# Patient Record
Sex: MALE | Race: OTHER | HISPANIC OR LATINO | Employment: UNEMPLOYED | URBAN - METROPOLITAN AREA
[De-identification: names, ages, dates, MRNs, and addresses within clinical notes are randomized per-mention and may not be internally consistent; named-entity substitution may affect disease eponyms.]

---

## 2021-05-01 ENCOUNTER — APPOINTMENT (OUTPATIENT)
Dept: RADIOLOGY | Facility: MEDICAL CENTER | Age: 51
End: 2021-05-01
Attending: EMERGENCY MEDICINE

## 2021-05-01 ENCOUNTER — HOSPITAL ENCOUNTER (EMERGENCY)
Facility: MEDICAL CENTER | Age: 51
End: 2021-05-02
Attending: EMERGENCY MEDICINE

## 2021-05-01 DIAGNOSIS — R55 SYNCOPE, UNSPECIFIED SYNCOPE TYPE: ICD-10-CM

## 2021-05-01 DIAGNOSIS — F10.920 ALCOHOLIC INTOXICATION WITHOUT COMPLICATION (HCC): ICD-10-CM

## 2021-05-01 LAB
ALBUMIN SERPL BCP-MCNC: 4.7 G/DL (ref 3.2–4.9)
ALBUMIN/GLOB SERPL: 1.4 G/DL
ALP SERPL-CCNC: 80 U/L (ref 30–99)
ALT SERPL-CCNC: 20 U/L (ref 2–50)
ANION GAP SERPL CALC-SCNC: 11 MMOL/L (ref 7–16)
AST SERPL-CCNC: 35 U/L (ref 12–45)
BASOPHILS # BLD AUTO: 0.6 % (ref 0–1.8)
BASOPHILS # BLD: 0.03 K/UL (ref 0–0.12)
BILIRUB SERPL-MCNC: 0.3 MG/DL (ref 0.1–1.5)
BUN SERPL-MCNC: 6 MG/DL (ref 8–22)
CALCIUM SERPL-MCNC: 9.2 MG/DL (ref 8.5–10.5)
CHLORIDE SERPL-SCNC: 100 MMOL/L (ref 96–112)
CO2 SERPL-SCNC: 25 MMOL/L (ref 20–33)
CREAT SERPL-MCNC: 0.62 MG/DL (ref 0.5–1.4)
EKG IMPRESSION: NORMAL
EOSINOPHIL # BLD AUTO: 0.04 K/UL (ref 0–0.51)
EOSINOPHIL NFR BLD: 0.7 % (ref 0–6.9)
ERYTHROCYTE [DISTWIDTH] IN BLOOD BY AUTOMATED COUNT: 43 FL (ref 35.9–50)
ETHANOL BLD-MCNC: 264.9 MG/DL (ref 0–10)
GLOBULIN SER CALC-MCNC: 3.3 G/DL (ref 1.9–3.5)
GLUCOSE SERPL-MCNC: 89 MG/DL (ref 65–99)
HCT VFR BLD AUTO: 42.5 % (ref 42–52)
HGB BLD-MCNC: 14.4 G/DL (ref 14–18)
IMM GRANULOCYTES # BLD AUTO: 0.01 K/UL (ref 0–0.11)
IMM GRANULOCYTES NFR BLD AUTO: 0.2 % (ref 0–0.9)
LIPASE SERPL-CCNC: 30 U/L (ref 11–82)
LYMPHOCYTES # BLD AUTO: 2.03 K/UL (ref 1–4.8)
LYMPHOCYTES NFR BLD: 37.5 % (ref 22–41)
MCH RBC QN AUTO: 31.6 PG (ref 27–33)
MCHC RBC AUTO-ENTMCNC: 33.9 G/DL (ref 33.7–35.3)
MCV RBC AUTO: 93.2 FL (ref 81.4–97.8)
MONOCYTES # BLD AUTO: 0.46 K/UL (ref 0–0.85)
MONOCYTES NFR BLD AUTO: 8.5 % (ref 0–13.4)
NEUTROPHILS # BLD AUTO: 2.84 K/UL (ref 1.82–7.42)
NEUTROPHILS NFR BLD: 52.5 % (ref 44–72)
NRBC # BLD AUTO: 0 K/UL
NRBC BLD-RTO: 0 /100 WBC
PLATELET # BLD AUTO: 307 K/UL (ref 164–446)
PMV BLD AUTO: 8.6 FL (ref 9–12.9)
POTASSIUM SERPL-SCNC: 3.5 MMOL/L (ref 3.6–5.5)
PROT SERPL-MCNC: 8 G/DL (ref 6–8.2)
RBC # BLD AUTO: 4.56 M/UL (ref 4.7–6.1)
SODIUM SERPL-SCNC: 136 MMOL/L (ref 135–145)
TROPONIN T SERPL-MCNC: <6 NG/L (ref 6–19)
WBC # BLD AUTO: 5.4 K/UL (ref 4.8–10.8)

## 2021-05-01 PROCEDURE — 85025 COMPLETE CBC W/AUTO DIFF WBC: CPT

## 2021-05-01 PROCEDURE — 71045 X-RAY EXAM CHEST 1 VIEW: CPT

## 2021-05-01 PROCEDURE — 84484 ASSAY OF TROPONIN QUANT: CPT

## 2021-05-01 PROCEDURE — 80053 COMPREHEN METABOLIC PANEL: CPT

## 2021-05-01 PROCEDURE — 93005 ELECTROCARDIOGRAM TRACING: CPT | Performed by: EMERGENCY MEDICINE

## 2021-05-01 PROCEDURE — 700105 HCHG RX REV CODE 258: Performed by: EMERGENCY MEDICINE

## 2021-05-01 PROCEDURE — 70450 CT HEAD/BRAIN W/O DYE: CPT

## 2021-05-01 PROCEDURE — 83690 ASSAY OF LIPASE: CPT

## 2021-05-01 PROCEDURE — 82077 ASSAY SPEC XCP UR&BREATH IA: CPT

## 2021-05-01 PROCEDURE — 99285 EMERGENCY DEPT VISIT HI MDM: CPT

## 2021-05-01 RX ORDER — SODIUM CHLORIDE 9 MG/ML
2000 INJECTION, SOLUTION INTRAVENOUS ONCE
Status: COMPLETED | OUTPATIENT
Start: 2021-05-01 | End: 2021-05-02

## 2021-05-01 RX ADMIN — SODIUM CHLORIDE 2000 ML: 9 INJECTION, SOLUTION INTRAVENOUS at 23:21

## 2021-05-02 VITALS
HEIGHT: 65 IN | SYSTOLIC BLOOD PRESSURE: 90 MMHG | TEMPERATURE: 98.4 F | RESPIRATION RATE: 13 BRPM | OXYGEN SATURATION: 100 % | BODY MASS INDEX: 25.83 KG/M2 | WEIGHT: 155 LBS | DIASTOLIC BLOOD PRESSURE: 62 MMHG | HEART RATE: 60 BPM

## 2021-05-02 NOTE — ED NOTES
Discharge paperwork given to pt, pt states he feels much better, all belongings accounted for, all questions answered, pt ambulated with steady gait to the ER exit

## 2021-05-02 NOTE — ED NOTES
Pt laying in bed, eyes closed, equal chest rise noted, no signs of distress noted at this time, will continue to monitor
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
## 2022-07-18 ENCOUNTER — HOSPITAL ENCOUNTER (EMERGENCY)
Facility: MEDICAL CENTER | Age: 52
End: 2022-07-18
Attending: EMERGENCY MEDICINE

## 2022-07-18 VITALS
RESPIRATION RATE: 16 BRPM | HEIGHT: 65 IN | BODY MASS INDEX: 26.66 KG/M2 | HEART RATE: 87 BPM | OXYGEN SATURATION: 98 % | TEMPERATURE: 97.9 F | WEIGHT: 160 LBS | DIASTOLIC BLOOD PRESSURE: 72 MMHG | SYSTOLIC BLOOD PRESSURE: 105 MMHG

## 2022-07-18 DIAGNOSIS — F10.920 ALCOHOLIC INTOXICATION WITHOUT COMPLICATION (HCC): ICD-10-CM

## 2022-07-18 PROCEDURE — 700111 HCHG RX REV CODE 636 W/ 250 OVERRIDE (IP): Performed by: EMERGENCY MEDICINE

## 2022-07-18 PROCEDURE — 99284 EMERGENCY DEPT VISIT MOD MDM: CPT

## 2022-07-18 PROCEDURE — 90715 TDAP VACCINE 7 YRS/> IM: CPT | Performed by: EMERGENCY MEDICINE

## 2022-07-18 PROCEDURE — 90471 IMMUNIZATION ADMIN: CPT

## 2022-07-18 RX ADMIN — CLOSTRIDIUM TETANI TOXOID ANTIGEN (FORMALDEHYDE INACTIVATED), CORYNEBACTERIUM DIPHTHERIAE TOXOID ANTIGEN (FORMALDEHYDE INACTIVATED), BORDETELLA PERTUSSIS TOXOID ANTIGEN (GLUTARALDEHYDE INACTIVATED), BORDETELLA PERTUSSIS FILAMENTOUS HEMAGGLUTININ ANTIGEN (FORMALDEHYDE INACTIVATED), BORDETELLA PERTUSSIS PERTACTIN ANTIGEN, AND BORDETELLA PERTUSSIS FIMBRIAE 2/3 ANTIGEN 0.5 ML: 5; 2; 2.5; 5; 3; 5 INJECTION, SUSPENSION INTRAMUSCULAR at 08:49
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
## 2022-07-18 NOTE — ED NOTES
Pt provided d/c instructions and verbalizes understanding. Pt has no further questions at this time and ambulated to lobby for d/c

## 2022-07-18 NOTE — ED TRIAGE NOTES
"Chief Complaint   Patient presents with   • Alcohol Intoxication     Patient found down but denies having fallen, has a small abrasion on his right forearm, states he drink \"enough alcohol to get stupid\" but can't elaborate beyond that     Patient BIBA for above. Patient is a poor historian. EMS states patient refused to answer questions, in my care patient was willing to answer questions but had difficulty concentrating and required frequent redirection back to the question. Patient is A/O x3 disoriented to time.     BP (!) 95/68   Pulse 69   Temp 36.4 °C (97.6 °F) (Temporal)   Resp 17   Ht 1.651 m (5' 5\")   Wt 72.6 kg (160 lb)   SpO2 95%   BMI 26.63 kg/m²   "

## 2022-12-28 ENCOUNTER — HOSPITAL ENCOUNTER (EMERGENCY)
Facility: MEDICAL CENTER | Age: 52
End: 2022-12-28
Attending: EMERGENCY MEDICINE

## 2022-12-28 VITALS
HEART RATE: 69 BPM | SYSTOLIC BLOOD PRESSURE: 109 MMHG | WEIGHT: 156.53 LBS | OXYGEN SATURATION: 99 % | BODY MASS INDEX: 26.05 KG/M2 | RESPIRATION RATE: 16 BRPM | TEMPERATURE: 99 F | DIASTOLIC BLOOD PRESSURE: 72 MMHG

## 2022-12-28 DIAGNOSIS — H66.001 NON-RECURRENT ACUTE SUPPURATIVE OTITIS MEDIA OF RIGHT EAR WITHOUT SPONTANEOUS RUPTURE OF TYMPANIC MEMBRANE: ICD-10-CM

## 2022-12-28 PROCEDURE — 99282 EMERGENCY DEPT VISIT SF MDM: CPT

## 2022-12-28 RX ORDER — AMOXICILLIN 500 MG/1
500 CAPSULE ORAL 3 TIMES DAILY
Qty: 30 CAPSULE | Refills: 0 | Status: SHIPPED | OUTPATIENT
Start: 2022-12-28

## 2022-12-28 ASSESSMENT — FIBROSIS 4 INDEX: FIB4 SCORE: 1.3

## 2022-12-28 NOTE — ED TRIAGE NOTES
Chief Complaint   Patient presents with    Ear Pain     Sx for 4 days, getting worse.  Denies hx of same

## 2022-12-28 NOTE — ED PROVIDER NOTES
ED Provider Note    CHIEF COMPLAINT  Chief Complaint   Patient presents with    Ear Pain     Sx for 4 days, getting worse.  Denies hx of same       LIMITATION TO HISTORY   Select: : None he spoke fairly good English    HPI  Farrukh Mccarthy is a 52 y.o. male who presents with a report that he has had right-sided ear pain only for the past 4 days without any discharge or bleeding.  Denies any trauma.  Denies any fever, chills, sweats or other complaints    EXTERNAL RECORDS REVIEWED  Select: Outpatient Notes reviewed his previous ER visits pattern they are of    REVIEW OF SYSTEMS  See HPI for further details.  No reported fever.  All other systems are negative.     PAST MEDICAL HISTORY   No previous significant ear infections    SURGICAL HISTORY  patient denies any surgical history    FAMILY HISTORY  No family history on file.    SOCIAL HISTORY  Social History     Tobacco Use    Smoking status: Never    Smokeless tobacco: Never   Vaping Use    Vaping Use: Never used   Substance and Sexual Activity    Alcohol use: Yes     Comment: states he drinks on ocasion     Drug use: No    Sexual activity: Not on file       CURRENT MEDICATIONS  Home Medications    **Home medications have not yet been reviewed for this encounter**         ALLERGIES  Allergies   Allergen Reactions    Nkda [No Known Drug Allergy]        PHYSICAL EXAM  VITAL SIGNS: /85   Pulse 79   Temp 36.2 °C (97.1 °F) (Temporal)   Resp 18   Wt 71 kg (156 lb 8.4 oz)   SpO2 98%   BMI 26.05 kg/m²    Focused exam shows normal left ear and right-sided ear that is retracted, erythematous with loss of light reflex but no perforation consistent with otitis media on the right    DIAGNOSTIC STUDIES / PROCEDURES      COURSE & MEDICAL DECISION MAKING  Pertinent Labs & Imaging studies reviewed. (See chart for details)    Differential Diagnosis Considered  Otitis media,  Perforation of eardrum    Patient understands his outpatient plan of care including amoxicillin  therapy and use of OTC medications for pain control and return if significant change in symptoms in spite of therapy    The patient will not drink alcohol nor drive with prescribed medications. The patient will return for worsening symptoms and is stable at the time of discharge. The patient verbalizes understanding and will comply.    FINAL IMPRESSION  1. Non-recurrent acute suppurative otitis media of right ear without spontaneous rupture of tympanic membrane           Electronically signed by: Je Hayes M.D., 12/28/2022 12:40 PM     heparin -last dose 0000 2/14  PNV

## 2022-12-28 NOTE — DISCHARGE INSTRUCTIONS
Please take amoxicillin as directed and use OTC medications for pain control such as Motrin and Tylenol

## 2022-12-28 NOTE — ED NOTES
Patient to room from lobby with steady gait. Changed into gown, connected to monitor. Agree with triage note. Charted for ERP.

## 2023-02-16 PROCEDURE — 99283 EMERGENCY DEPT VISIT LOW MDM: CPT

## 2023-02-17 ENCOUNTER — APPOINTMENT (OUTPATIENT)
Dept: RADIOLOGY | Facility: MEDICAL CENTER | Age: 53
End: 2023-02-17
Attending: EMERGENCY MEDICINE

## 2023-02-17 ENCOUNTER — HOSPITAL ENCOUNTER (EMERGENCY)
Facility: MEDICAL CENTER | Age: 53
End: 2023-02-17
Attending: EMERGENCY MEDICINE

## 2023-02-17 VITALS
WEIGHT: 157.63 LBS | HEART RATE: 58 BPM | OXYGEN SATURATION: 95 % | RESPIRATION RATE: 18 BRPM | DIASTOLIC BLOOD PRESSURE: 65 MMHG | BODY MASS INDEX: 26.26 KG/M2 | HEIGHT: 65 IN | TEMPERATURE: 98 F | SYSTOLIC BLOOD PRESSURE: 105 MMHG

## 2023-02-17 DIAGNOSIS — T43.615A ADVERSE EFFECT OF CAFFEINE, INITIAL ENCOUNTER: ICD-10-CM

## 2023-02-17 LAB
ALBUMIN SERPL BCP-MCNC: 4.1 G/DL (ref 3.2–4.9)
ALBUMIN/GLOB SERPL: 1.2 G/DL
ALP SERPL-CCNC: 95 U/L (ref 30–99)
ALT SERPL-CCNC: 17 U/L (ref 2–50)
ANION GAP SERPL CALC-SCNC: 12 MMOL/L (ref 7–16)
AST SERPL-CCNC: 21 U/L (ref 12–45)
BASOPHILS # BLD AUTO: 0.4 % (ref 0–1.8)
BASOPHILS # BLD: 0.03 K/UL (ref 0–0.12)
BILIRUB SERPL-MCNC: 0.6 MG/DL (ref 0.1–1.5)
BUN SERPL-MCNC: 10 MG/DL (ref 8–22)
CALCIUM ALBUM COR SERPL-MCNC: 9.2 MG/DL (ref 8.5–10.5)
CALCIUM SERPL-MCNC: 9.3 MG/DL (ref 8.5–10.5)
CHLORIDE SERPL-SCNC: 97 MMOL/L (ref 96–112)
CO2 SERPL-SCNC: 23 MMOL/L (ref 20–33)
CREAT SERPL-MCNC: 0.68 MG/DL (ref 0.5–1.4)
EKG IMPRESSION: NORMAL
EOSINOPHIL # BLD AUTO: 0.11 K/UL (ref 0–0.51)
EOSINOPHIL NFR BLD: 1.5 % (ref 0–6.9)
ERYTHROCYTE [DISTWIDTH] IN BLOOD BY AUTOMATED COUNT: 40 FL (ref 35.9–50)
GFR SERPLBLD CREATININE-BSD FMLA CKD-EPI: 112 ML/MIN/1.73 M 2
GLOBULIN SER CALC-MCNC: 3.3 G/DL (ref 1.9–3.5)
GLUCOSE BLD STRIP.AUTO-MCNC: 94 MG/DL (ref 65–99)
GLUCOSE SERPL-MCNC: 100 MG/DL (ref 65–99)
HCT VFR BLD AUTO: 39.2 % (ref 42–52)
HGB BLD-MCNC: 13.4 G/DL (ref 14–18)
IMM GRANULOCYTES # BLD AUTO: 0.02 K/UL (ref 0–0.11)
IMM GRANULOCYTES NFR BLD AUTO: 0.3 % (ref 0–0.9)
LYMPHOCYTES # BLD AUTO: 2.34 K/UL (ref 1–4.8)
LYMPHOCYTES NFR BLD: 31 % (ref 22–41)
MCH RBC QN AUTO: 29.6 PG (ref 27–33)
MCHC RBC AUTO-ENTMCNC: 34.2 G/DL (ref 33.7–35.3)
MCV RBC AUTO: 86.7 FL (ref 81.4–97.8)
MONOCYTES # BLD AUTO: 0.69 K/UL (ref 0–0.85)
MONOCYTES NFR BLD AUTO: 9.1 % (ref 0–13.4)
NEUTROPHILS # BLD AUTO: 4.36 K/UL (ref 1.82–7.42)
NEUTROPHILS NFR BLD: 57.7 % (ref 44–72)
NRBC # BLD AUTO: 0 K/UL
NRBC BLD-RTO: 0 /100 WBC
PLATELET # BLD AUTO: 328 K/UL (ref 164–446)
PMV BLD AUTO: 8.7 FL (ref 9–12.9)
POTASSIUM SERPL-SCNC: 3.4 MMOL/L (ref 3.6–5.5)
PROT SERPL-MCNC: 7.4 G/DL (ref 6–8.2)
RBC # BLD AUTO: 4.52 M/UL (ref 4.7–6.1)
SODIUM SERPL-SCNC: 132 MMOL/L (ref 135–145)
TROPONIN T SERPL-MCNC: <6 NG/L (ref 6–19)
WBC # BLD AUTO: 7.6 K/UL (ref 4.8–10.8)

## 2023-02-17 PROCEDURE — 80053 COMPREHEN METABOLIC PANEL: CPT

## 2023-02-17 PROCEDURE — 82962 GLUCOSE BLOOD TEST: CPT

## 2023-02-17 PROCEDURE — 36415 COLL VENOUS BLD VENIPUNCTURE: CPT

## 2023-02-17 PROCEDURE — 71045 X-RAY EXAM CHEST 1 VIEW: CPT

## 2023-02-17 PROCEDURE — 84484 ASSAY OF TROPONIN QUANT: CPT

## 2023-02-17 PROCEDURE — 99283 EMERGENCY DEPT VISIT LOW MDM: CPT

## 2023-02-17 PROCEDURE — 93005 ELECTROCARDIOGRAM TRACING: CPT | Performed by: EMERGENCY MEDICINE

## 2023-02-17 PROCEDURE — 85025 COMPLETE CBC W/AUTO DIFF WBC: CPT

## 2023-02-17 ASSESSMENT — FIBROSIS 4 INDEX: FIB4 SCORE: 1.3

## 2023-02-17 NOTE — ED PROVIDER NOTES
ED Provider Note    CHIEF COMPLAINT  Chief Complaint   Patient presents with    Shortness of Breath     Intermittent, states it started after having one energy drink at 1700 and a [employer] coffee at 1800.  States he feels like there is tingling to his extremities and extra energy going throughout his body.  Concerned there is damage to his blood circulation.  Not experiencing SOB at this time, NAD noted.       EXTERNAL RECORDS REVIEWED  Inpatient Notes prior ER notes for ear pain and alcohol intoxication    HPI/ROS  LIMITATION TO HISTORY   Select: : None  OUTSIDE HISTORIAN(S):  None    Farrukh Mccarthy is a 52 y.o. male who presents to the emerge department feeling generally well with primary complaint of shortness of breath after drinking multiple energy drinks.  He explains that he was at the library this evening and he had an energy drink which she was mixing with water but then also had a [employer] mocha drink as well.  After consuming these beverages he then started to feel generally unwell to include thirst and shortness of breath.  Now starting to feel better as the hours of gone by but was concerned about the intake of these caffeinated and sweet beverages feeling that he was intolerant to them.    He does not have a local primary care physician.  Denies any past medical history.  Denies any over-the-counter medications or prescription medications at this point.    PAST MEDICAL HISTORY   Denies    SURGICAL HISTORY  patient denies any surgical history    FAMILY HISTORY  History reviewed. No pertinent family history.    SOCIAL HISTORY  Social History     Tobacco Use    Smoking status: Some Days     Types: Cigarettes    Smokeless tobacco: Never   Vaping Use    Vaping Use: Never used   Substance and Sexual Activity    Alcohol use: Yes     Comment: Last drink 11/2022    Drug use: No    Sexual activity: Not on file       CURRENT MEDICATIONS  Home Medications       Reviewed by Sophy Bethea R.N. (Registered Nurse)  "on 02/17/23 at 0014  Med List Status: Partial     Medication Last Dose Status   amoxicillin (AMOXIL) 500 MG Cap  Active   ondansetron (ZOFRAN ODT) 4 MG TABLET DISPERSIBLE  Active                    ALLERGIES  Allergies   Allergen Reactions    Nkda [No Known Drug Allergy]        PHYSICAL EXAM  VITAL SIGNS: /65   Pulse (!) 58   Temp 36.7 °C (98 °F) (Temporal)   Resp 18   Ht 1.651 m (5' 5\")   Wt 71.5 kg (157 lb 10.1 oz)   SpO2 95%   BMI 26.23 kg/m²        Pulse ox interpretation: I interpret this pulse ox as normal.  Constitutional: Alert in no apparent distress.  HENT: No signs of trauma, Bilateral external ears normal, Nose normal.   Eyes: Pupils are equal and reactive  Neck: Normal range of motion, No tenderness, Supple  Cardiovascular: Regular rate and rhythm, no murmurs.   Thorax & Lungs: Normal breath sounds, No respiratory distress, No wheezing, No chest tenderness.   Abdomen: Bowel sounds normal, Soft, No tenderness  Skin: Warm, Dry, No erythema, No rash.   Extremities: Intact distal pulses, No edema, No tenderness, No cyanosis,  Negative Carla's sign.   Musculoskeletal: Good range of motion in all major joints. No tenderness to palpation or major deformities noted.   Neurologic: Alert , Normal motor function, Normal sensory function, No focal deficits noted.   Psychiatric: Affect normal, Judgment normal, Mood normal.         DIAGNOSTIC STUDIES / PROCEDURES      LABS  Results for orders placed or performed during the hospital encounter of 02/17/23   CBC w/ Differential   Result Value Ref Range    WBC 7.6 4.8 - 10.8 K/uL    RBC 4.52 (L) 4.70 - 6.10 M/uL    Hemoglobin 13.4 (L) 14.0 - 18.0 g/dL    Hematocrit 39.2 (L) 42.0 - 52.0 %    MCV 86.7 81.4 - 97.8 fL    MCH 29.6 27.0 - 33.0 pg    MCHC 34.2 33.7 - 35.3 g/dL    RDW 40.0 35.9 - 50.0 fL    Platelet Count 328 164 - 446 K/uL    MPV 8.7 (L) 9.0 - 12.9 fL    Neutrophils-Polys 57.70 44.00 - 72.00 %    Lymphocytes 31.00 22.00 - 41.00 %    Monocytes 9.10 " 0.00 - 13.40 %    Eosinophils 1.50 0.00 - 6.90 %    Basophils 0.40 0.00 - 1.80 %    Immature Granulocytes 0.30 0.00 - 0.90 %    Nucleated RBC 0.00 /100 WBC    Neutrophils (Absolute) 4.36 1.82 - 7.42 K/uL    Lymphs (Absolute) 2.34 1.00 - 4.80 K/uL    Monos (Absolute) 0.69 0.00 - 0.85 K/uL    Eos (Absolute) 0.11 0.00 - 0.51 K/uL    Baso (Absolute) 0.03 0.00 - 0.12 K/uL    Immature Granulocytes (abs) 0.02 0.00 - 0.11 K/uL    NRBC (Absolute) 0.00 K/uL   Troponin STAT   Result Value Ref Range    Troponin T <6 6 - 19 ng/L   Complete Metabolic Panel (CMP)   Result Value Ref Range    Sodium 132 (L) 135 - 145 mmol/L    Potassium 3.4 (L) 3.6 - 5.5 mmol/L    Chloride 97 96 - 112 mmol/L    Co2 23 20 - 33 mmol/L    Anion Gap 12.0 7.0 - 16.0    Glucose 100 (H) 65 - 99 mg/dL    Bun 10 8 - 22 mg/dL    Creatinine 0.68 0.50 - 1.40 mg/dL    Calcium 9.3 8.5 - 10.5 mg/dL    AST(SGOT) 21 12 - 45 U/L    ALT(SGPT) 17 2 - 50 U/L    Alkaline Phosphatase 95 30 - 99 U/L    Total Bilirubin 0.6 0.1 - 1.5 mg/dL    Albumin 4.1 3.2 - 4.9 g/dL    Total Protein 7.4 6.0 - 8.2 g/dL    Globulin 3.3 1.9 - 3.5 g/dL    A-G Ratio 1.2 g/dL   ESTIMATED GFR   Result Value Ref Range    GFR (CKD-EPI) 112 >60 mL/min/1.73 m 2   CORRECTED CALCIUM   Result Value Ref Range    Correct Calcium 9.2 8.5 - 10.5 mg/dL   EKG   Result Value Ref Range    Report       [facility] Emergency Dept.    Test Date:  2023  Pt Name:    MARIELLA OREILLY                Department: ER  MRN:        [id number]                      Room:       BL 21  Gender:     Male                         Technician: [id number]  :        1970                   Requested By:OSVALDO MARTINEZ  Order #:    [id number]                    Reading MD:    Measurements  Intervals                                Axis  Rate:       60                           P:          44  NM:         172                          QRS:        79  QRSD:       85                           T:          80  QT:          444  QTc:        444    Interpretive Statements  Sinus rhythm  ST elev, probable normal early repol pattern  Compared to ECG 05/01/2021 22:07:57  ST (T wave) deviation now present     POCT glucose device results   Result Value Ref Range    POC Glucose, Blood 94 65 - 99 mg/dL         RADIOLOGY  I have independently interpreted the diagnostic imaging associated with this visit and am waiting the final reading from the radiologist.   My preliminary interpretation is a follows:  cxr NAD  Radiologist interpretation:   DX-CHEST-PORTABLE (1 VIEW)   Final Result         1.  No acute cardiopulmonary disease.              COURSE & MEDICAL DECISION MAKING    ED Observation Status? No; Patient does not meet criteria for ED Observation.     INITIAL ASSESSMENT, COURSE AND PLAN  Care Narrative: 52-year-old male presenting to the emerged part with the above presentation.  Patient with excessive caffeine and sugary drink intake prior to arrival.  He has had ongoing improvement after his initial symptomatology as described above.  Will evaluate for any other more concerning pathology that would otherwise cause tachycardia, dysrhythmia or palpitations.        DISPOSITION AND DISCUSSIONS  I have discussed management of the patient with the following physicians and ASHLY's: None    Discussion of management with other QHP or appropriate source(s): None     Escalation of care considered, and ultimately not performed:acute inpatient care management, however at this time, the patient is most appropriate for outpatient management    Barriers to care at this time, including but not limited to: Patient does not have established PCP.     Decision tools and prescription drugs considered including, but not limited to: HEART Score low .    52-year-old male presented emerged part with the above presentation.  Work-up is benign.  Now asymptomatic.  Stated above I have low suspicion for ACS etiologies.  His heart score is low.  Work-up is reassuring.   At this point I had bedside conversation regarding healthy diet to include beverages and decreasing overall caffeine at any given time.  He will follow-up with PCP is now being referred.  He is also understanding return precautions here to the ER as he is currently not established with a PCP.        FINAL DIAGNOSIS  1. Adverse effect of caffeine, initial encounter           Electronically signed by: David Osullivan M.D., 2/17/2023 1:08 AM

## 2023-02-17 NOTE — ED TRIAGE NOTES
"Chief Complaint   Patient presents with    Shortness of Breath     Intermittent, states it started after having one energy drink at 1700 and a [employer] coffee at 1800.  States he feels like there is tingling to his extremities and extra energy going throughout his body.  Concerned there is damage to his blood circulation.  Not experiencing SOB at this time, NAD noted.       Pt ambulated to triage. Pt A&Ox4, came in for above complaint.     Pt to lobby . Pt educated on alerting staff in changes to condition. Pt verbalized understanding.     /87   Pulse 74   Temp 36.4 °C (97.5 °F) (Temporal)   Resp 16   Ht 1.651 m (5' 5\")   Wt 71.5 kg (157 lb 10.1 oz)   SpO2 98%   BMI 26.23 kg/m²     "

## 2023-02-17 NOTE — ED NOTES
Patient discharged from [facility] ED to home with self. Discharge teaching completed at bedside and patient signature obtained. All questions and concerns addressed. PIV removed. All pt belongings with pt at time of discharge.